# Patient Record
Sex: FEMALE | Race: BLACK OR AFRICAN AMERICAN | Employment: UNEMPLOYED
[De-identification: names, ages, dates, MRNs, and addresses within clinical notes are randomized per-mention and may not be internally consistent; named-entity substitution may affect disease eponyms.]

---

## 2023-06-09 ENCOUNTER — HOSPITAL ENCOUNTER (EMERGENCY)
Facility: HOSPITAL | Age: 23
Discharge: HOME | End: 2023-06-09
Attending: FAMILY MEDICINE

## 2023-06-09 VITALS
HEIGHT: 64 IN | WEIGHT: 190 LBS | RESPIRATION RATE: 16 BRPM | DIASTOLIC BLOOD PRESSURE: 79 MMHG | BODY MASS INDEX: 32.44 KG/M2 | TEMPERATURE: 98.3 F | OXYGEN SATURATION: 100 % | SYSTOLIC BLOOD PRESSURE: 153 MMHG | HEART RATE: 103 BPM

## 2023-06-09 DIAGNOSIS — M79.605 PAIN IN LEFT LEG: ICD-10-CM

## 2023-06-09 DIAGNOSIS — M79.10 MYALGIA: Primary | ICD-10-CM

## 2023-06-09 PROCEDURE — 93005 ELECTROCARDIOGRAM TRACING: CPT | Performed by: FAMILY MEDICINE

## 2023-06-09 ASSESSMENT — PAIN - FUNCTIONAL ASSESSMENT: PAIN_FUNCTIONAL_ASSESSMENT: NONE - DENIES PAIN

## 2023-06-09 NOTE — ED PROVIDER NOTES
well a 3) list of reasons why they would want to return to the ED prior to their follow-up appointment, should their condition change. DISCHARGE PLAN:    1. There are no discharge medications for this patient. 2.  Return to ED if worse       3. Medication List      You have not been prescribed any medications. Diagnosis     Clinical Impression:    1. Myalgia    2. Pain in left leg        Attestations:    Giovana Brand DO    Please note that this dictation was completed with [employer], the computer voice recognition software. Quite often unanticipated grammatical, syntax, homophones, and other interpretive errors are inadvertently transcribed by the computer software. Please disregard these errors. Please excuse any errors that have escaped final proofreading. Thank you.          Eulogio Mcgregor DO  06/09/23 Quin Robertson

## 2023-06-09 NOTE — ED TRIAGE NOTES
Pt states she started with left leg pain x1 week. States when she lays down at night, her left leg tinglings. States she has been doing moderate work outs with [facility].

## 2023-06-10 LAB
EKG ATRIAL RATE: 130 BPM
EKG DIAGNOSIS: NORMAL
EKG P AXIS: 58 DEGREES
EKG P-R INTERVAL: 154 MS
EKG Q-T INTERVAL: 294 MS
EKG QRS DURATION: 80 MS
EKG QTC CALCULATION (BAZETT): 432 MS
EKG R AXIS: 40 DEGREES
EKG T AXIS: 33 DEGREES
EKG VENTRICULAR RATE: 130 BPM